# Patient Record
Sex: FEMALE | Race: WHITE | NOT HISPANIC OR LATINO | URBAN - NONMETROPOLITAN AREA
[De-identification: names, ages, dates, MRNs, and addresses within clinical notes are randomized per-mention and may not be internally consistent; named-entity substitution may affect disease eponyms.]

---

## 2018-04-12 PROBLEM — H40.013: Noted: 2019-01-15

## 2018-04-12 PROBLEM — H04.123: Noted: 2019-01-15

## 2018-04-12 PROBLEM — H25.043: Noted: 2018-04-12

## 2018-04-12 PROBLEM — H52.4: Noted: 2019-01-15

## 2019-01-15 ENCOUNTER — IMPORTED ENCOUNTER (OUTPATIENT)
Dept: URBAN - NONMETROPOLITAN AREA CLINIC 1 | Facility: CLINIC | Age: 54
End: 2019-01-15

## 2019-01-15 PROBLEM — H52.4: Noted: 2019-01-15

## 2019-01-15 PROBLEM — H40.013: Noted: 2019-01-15

## 2019-01-15 PROBLEM — H04.123: Noted: 2019-01-15

## 2019-01-15 PROCEDURE — 92015 DETERMINE REFRACTIVE STATE: CPT

## 2019-01-15 PROCEDURE — 92014 COMPRE OPH EXAM EST PT 1/>: CPT

## 2019-01-15 NOTE — PATIENT DISCUSSION
Hyperopia / Presbyopia OU- Discussed diagnosis in detail with patient- Hx of LASIK OU- New glasses Rx given- Continue to monitorBorderline Glaucoma OU- Discussed diagnosis in detail with patient- Family hx of LTG in mother- OCT done previously normal RNFL.- VF done last visit first time. OD reliable OS unreliable. VF OS repeated afterwards better than previous with no defects noted. - Gonio done previously. - Optos done previously. - Appears stable on exam today- IOP is 12 OU - Cup / Disc 0.65 OD and 0.6 OS. - Continue without drops at this time- PACHY done previously  - Continue to monitor PRNDES OU (OD>>OS)-  Discussed diagnosis in detail w/ pt today-  Signs/symptoms associated discussed-  Hx of plug placement OU not present on exam today-  Advised to use Systane Ultra OD QID PRN-  Monitor PRN Cataracts OU trace-  Discussed diagnosis in detail w/ pt today-  No VA or glare complaint noted-  Monitor for changes/progression; 's Notes: RM  10/5/2017DFE  10/5/2017Optos 4/5/17OCT disc 4/12/18VF 4/3/2017Gonio 4/5/17PACHY 9/28/15

## 2020-02-11 ENCOUNTER — IMPORTED ENCOUNTER (OUTPATIENT)
Dept: URBAN - NONMETROPOLITAN AREA CLINIC 1 | Facility: CLINIC | Age: 55
End: 2020-02-11

## 2020-02-11 PROCEDURE — 92014 COMPRE OPH EXAM EST PT 1/>: CPT

## 2020-02-11 PROCEDURE — 92015 DETERMINE REFRACTIVE STATE: CPT

## 2020-02-11 NOTE — PATIENT DISCUSSION
Hyperopia / Presbyopia OU- Discussed diagnosis in detail with patient- Hx of LASIK OU- New glasses Rx given- Continue to monitorBorderline Glaucoma OU- Discussed diagnosis in detail with patient- Family hx of LTG in mother- OCT done previously normal RNFL.- VF done last visit first time. OD reliable OS unreliable. VF OS repeated afterwards better than previous with no defects noted. - Gonio done previously. - Optos done previously. - Appears stable on exam today- IOP is 12 OU - Cup / Disc 0.65 OD and 0.6 OS. - Continue without drops at this time- PACHY done previously  - Continue to monitor PRN- n/a per pt requestDES OU (OD>>OS)-  Discussed diagnosis in detail w/ pt today-  Signs/symptoms associated discussed-  Hx of plug placement OU not present on exam today-  Advised to use Systane Ultra OD QID PRN-  Monitor PRN Cataracts OU trace-  Discussed diagnosis in detail w/ pt today-  No VA or glare complaint noted-  Monitor for changes/progression; 's Notes: RM 2/11/20DFE   2/11/20Optos 4/5/17OCT disc 4/12/18VF 4/3/2017Gonio 4/5/17PACHY 9/28/15

## 2020-02-18 ENCOUNTER — IMPORTED ENCOUNTER (OUTPATIENT)
Dept: URBAN - NONMETROPOLITAN AREA CLINIC 1 | Facility: CLINIC | Age: 55
End: 2020-02-18

## 2020-02-18 PROCEDURE — 92133 CPTRZD OPH DX IMG PST SGM ON: CPT

## 2020-02-18 PROCEDURE — 92083 EXTENDED VISUAL FIELD XM: CPT

## 2021-11-16 ENCOUNTER — IMPORTED ENCOUNTER (OUTPATIENT)
Dept: URBAN - NONMETROPOLITAN AREA CLINIC 1 | Facility: CLINIC | Age: 56
End: 2021-11-16

## 2021-11-16 PROCEDURE — 92015 DETERMINE REFRACTIVE STATE: CPT

## 2021-11-16 PROCEDURE — 92014 COMPRE OPH EXAM EST PT 1/>: CPT

## 2021-11-16 NOTE — PATIENT DISCUSSION
Hyperopia / Presbyopia OU- Discussed diagnosis in detail with patient- Hx of LASIK OU- New glasses Rx given today- Continue to monitorBorderline Glaucoma OU- Discussed diagnosis in detail with patient- Family hx of LTG in mother- OCT done previously normal RNFL.- VF done previously OD reliable OS unreliable. VF OS repeated afterwards better than previous with no defects noted. - PACHY done previously OD [place] done previously. - Optos done previously. - Appears stable on exam today- IOP is 12 OU - Cup / Disc 0.65 OD and 0.6 OS. - Continue without drops at this time- Continue to monitorDES OU (OD>>OS)- Discussed diagnosis in detail w/ pt today- Signs/symptoms associated discussed- Hx of plug placement OU not present on exam today- Advised to use Systane Ultra OD QID PRN- Monitor PRN Cataracts OU trace- Discussed diagnosis in detail with patient- Discussed signs and symptoms of progression- Discussed UV protection- No treatment needed at this time - Continue to monitor; 's Notes: RM 2/11/20DFE   2/11/20Optos 4/5/17OCT disc 4/12/18VF 4/3/2017Gonio 4/5/17PACHY 9/28/15

## 2022-04-09 ASSESSMENT — VISUAL ACUITY
OS_CC: 20/25-
OS_CC: 20/30+
OD_CC: 20/22-
OD_CC: 20/20
OD_CC: 20/30
OS_CC: 20/30

## 2022-04-09 ASSESSMENT — PACHYMETRY
OD_CT_UM: 524; ADJ: THIN
OS_CT_UM: 529; ADJ: THIN
OS_CT_UM: 529; ADJ: THIN
OD_CT_UM: 524; ADJ: THIN
OS_CT_UM: 529; ADJ: THIN
OD_CT_UM: 524; ADJ: THIN

## 2022-04-09 ASSESSMENT — TONOMETRY
OD_IOP_MMHG: 12
OS_IOP_MMHG: 12
OS_IOP_MMHG: 12
OD_IOP_MMHG: 12
OS_IOP_MMHG: 12
OD_IOP_MMHG: 12

## 2022-05-19 ENCOUNTER — FOLLOW UP (OUTPATIENT)
Dept: URBAN - NONMETROPOLITAN AREA CLINIC 1 | Facility: CLINIC | Age: 57
End: 2022-05-19

## 2022-05-19 DIAGNOSIS — H40.013: ICD-10-CM

## 2022-05-19 PROCEDURE — 99213 OFFICE O/P EST LOW 20 MIN: CPT

## 2022-05-19 PROCEDURE — 92083 EXTENDED VISUAL FIELD XM: CPT

## 2022-05-19 PROCEDURE — 92133 CPTRZD OPH DX IMG PST SGM ON: CPT

## 2022-05-19 ASSESSMENT — TONOMETRY
OD_IOP_MMHG: 12
OS_IOP_MMHG: 12

## 2022-05-19 ASSESSMENT — VISUAL ACUITY
OS_CC: 20/25-1
OU_CC: 20/25-1
OD_CC: 20/30-1

## 2023-01-27 ENCOUNTER — FOLLOW UP (OUTPATIENT)
Dept: URBAN - NONMETROPOLITAN AREA CLINIC 1 | Facility: CLINIC | Age: 58
End: 2023-01-27

## 2023-01-27 DIAGNOSIS — H52.4: ICD-10-CM

## 2023-01-27 PROCEDURE — 92015 DETERMINE REFRACTIVE STATE: CPT

## 2023-01-27 PROCEDURE — 92014 COMPRE OPH EXAM EST PT 1/>: CPT

## 2023-01-27 PROCEDURE — 92499OP2 OPTOMAP RETINAL SCREENING BOTH EYES

## 2023-01-27 ASSESSMENT — VISUAL ACUITY
OU_CC: 20/20
OD_CC: 20/25
OS_CC: 20/20

## 2023-01-27 ASSESSMENT — TONOMETRY
OD_IOP_MMHG: 16
OS_IOP_MMHG: 16

## 2023-01-27 NOTE — PATIENT DISCUSSION
The IOP is in the target range, but higher today from 12 to 16. However, she reports a recent course of Prednisolone treatment due to severe sinus infection.

## 2023-07-31 ENCOUNTER — FOLLOW UP (OUTPATIENT)
Dept: URBAN - NONMETROPOLITAN AREA CLINIC 1 | Facility: CLINIC | Age: 58
End: 2023-07-31

## 2023-07-31 DIAGNOSIS — H40.013: ICD-10-CM

## 2023-07-31 PROCEDURE — 99213 OFFICE O/P EST LOW 20 MIN: CPT

## 2023-07-31 PROCEDURE — 92133 CPTRZD OPH DX IMG PST SGM ON: CPT

## 2023-07-31 ASSESSMENT — TONOMETRY
OS_IOP_MMHG: 16
OD_IOP_MMHG: 16

## 2023-07-31 ASSESSMENT — VISUAL ACUITY
OU_CC: 20/20-1
OD_CC: 20/30
OS_CC: 20/20

## 2024-02-01 ENCOUNTER — COMPREHENSIVE EXAM (OUTPATIENT)
Dept: URBAN - NONMETROPOLITAN AREA CLINIC 1 | Facility: CLINIC | Age: 59
End: 2024-02-01

## 2024-02-01 DIAGNOSIS — H52.4: ICD-10-CM

## 2024-02-01 PROCEDURE — 92015 DETERMINE REFRACTIVE STATE: CPT

## 2024-02-01 PROCEDURE — 92499OP2 OPTOMAP RETINAL SCREENING BOTH EYES

## 2024-02-01 PROCEDURE — 92014 COMPRE OPH EXAM EST PT 1/>: CPT

## 2024-02-01 ASSESSMENT — VISUAL ACUITY
OU_CC: 20/25
OS_CC: 20/25
OD_CC: 20/20-1

## 2024-02-01 ASSESSMENT — TONOMETRY
OD_IOP_MMHG: 18
OS_IOP_MMHG: 18